# Patient Record
Sex: MALE | Race: WHITE | NOT HISPANIC OR LATINO | Employment: FULL TIME | RURAL
[De-identification: names, ages, dates, MRNs, and addresses within clinical notes are randomized per-mention and may not be internally consistent; named-entity substitution may affect disease eponyms.]

---

## 2023-10-10 ENCOUNTER — OFFICE VISIT (OUTPATIENT)
Dept: FAMILY MEDICINE CLINIC | Facility: CLINIC | Age: 58
End: 2023-10-10
Payer: COMMERCIAL

## 2023-10-10 VITALS
BODY MASS INDEX: 28.39 KG/M2 | OXYGEN SATURATION: 98 % | WEIGHT: 187.3 LBS | HEIGHT: 68 IN | DIASTOLIC BLOOD PRESSURE: 73 MMHG | HEART RATE: 78 BPM | SYSTOLIC BLOOD PRESSURE: 126 MMHG

## 2023-10-10 DIAGNOSIS — E78.00 PURE HYPERCHOLESTEROLEMIA: ICD-10-CM

## 2023-10-10 DIAGNOSIS — D69.6 THROMBOCYTOPENIA: ICD-10-CM

## 2023-10-10 DIAGNOSIS — F51.01 PRIMARY INSOMNIA: ICD-10-CM

## 2023-10-10 DIAGNOSIS — R03.0 ELEVATED BLOOD PRESSURE READING WITHOUT DIAGNOSIS OF HYPERTENSION: ICD-10-CM

## 2023-10-10 DIAGNOSIS — Z11.59 NEED FOR HEPATITIS C SCREENING TEST: ICD-10-CM

## 2023-10-10 DIAGNOSIS — I51.89 DIASTOLIC DYSFUNCTION: ICD-10-CM

## 2023-10-10 DIAGNOSIS — Z85.828 HISTORY OF NONMELANOMA SKIN CANCER: ICD-10-CM

## 2023-10-10 DIAGNOSIS — Z00.01 ENCOUNTER FOR GENERAL ADULT MEDICAL EXAMINATION WITH ABNORMAL FINDINGS: Primary | ICD-10-CM

## 2023-10-10 DIAGNOSIS — Z13.1 SCREENING FOR DIABETES MELLITUS: ICD-10-CM

## 2023-10-10 DIAGNOSIS — Z87.442 HISTORY OF KIDNEY STONES: ICD-10-CM

## 2023-10-10 DIAGNOSIS — N40.1 BENIGN PROSTATIC HYPERPLASIA WITH NOCTURIA: ICD-10-CM

## 2023-10-10 DIAGNOSIS — R35.1 BENIGN PROSTATIC HYPERPLASIA WITH NOCTURIA: ICD-10-CM

## 2023-10-10 DIAGNOSIS — Z13.220 SCREENING FOR HYPERLIPIDEMIA: ICD-10-CM

## 2023-10-11 ENCOUNTER — TELEPHONE (OUTPATIENT)
Dept: FAMILY MEDICINE CLINIC | Facility: CLINIC | Age: 58
End: 2023-10-11
Payer: COMMERCIAL

## 2023-10-11 DIAGNOSIS — D69.6 THROMBOCYTOPENIA: Primary | ICD-10-CM

## 2023-10-11 PROBLEM — E78.00 PURE HYPERCHOLESTEROLEMIA: Status: ACTIVE | Noted: 2023-10-11

## 2023-10-11 PROBLEM — I51.89 DIASTOLIC DYSFUNCTION: Status: ACTIVE | Noted: 2023-10-11

## 2023-10-11 PROBLEM — Z85.828 HISTORY OF NONMELANOMA SKIN CANCER: Status: ACTIVE | Noted: 2023-10-11

## 2023-10-11 LAB
ALBUMIN SERPL-MCNC: 4.9 G/DL (ref 3.8–4.9)
ALBUMIN/GLOB SERPL: 2.6 {RATIO} (ref 1.2–2.2)
ALP SERPL-CCNC: 84 IU/L (ref 44–121)
ALT SERPL-CCNC: 24 IU/L (ref 0–44)
AST SERPL-CCNC: 24 IU/L (ref 0–40)
BASOPHILS # BLD AUTO: 0 X10E3/UL (ref 0–0.2)
BASOPHILS NFR BLD AUTO: 1 %
BILIRUB SERPL-MCNC: 1.1 MG/DL (ref 0–1.2)
BUN SERPL-MCNC: 14 MG/DL (ref 8–27)
BUN/CREAT SERPL: 15 (ref 10–24)
CALCIUM SERPL-MCNC: 9.1 MG/DL (ref 8.6–10.2)
CHLORIDE SERPL-SCNC: 100 MMOL/L (ref 96–106)
CHOLEST SERPL-MCNC: 225 MG/DL (ref 100–199)
CO2 SERPL-SCNC: 26 MMOL/L (ref 20–29)
CREAT SERPL-MCNC: 0.96 MG/DL (ref 0.76–1.27)
EGFRCR SERPLBLD CKD-EPI 2021: 90 ML/MIN/1.73
EOSINOPHIL # BLD AUTO: 0 X10E3/UL (ref 0–0.4)
EOSINOPHIL NFR BLD AUTO: 1 %
ERYTHROCYTE [DISTWIDTH] IN BLOOD BY AUTOMATED COUNT: 12.3 % (ref 11.6–15.4)
GLOBULIN SER CALC-MCNC: 1.9 G/DL (ref 1.5–4.5)
GLUCOSE SERPL-MCNC: 85 MG/DL (ref 70–99)
HBA1C MFR BLD: 5.6 % (ref 4.8–5.6)
HCT VFR BLD AUTO: 45.7 % (ref 37.5–51)
HCV IGG SERPL QL IA: NON REACTIVE
HDLC SERPL-MCNC: 50 MG/DL
HGB BLD-MCNC: 15.7 G/DL (ref 13–17.7)
IMM GRANULOCYTES # BLD AUTO: 0 X10E3/UL (ref 0–0.1)
IMM GRANULOCYTES NFR BLD AUTO: 0 %
LDLC SERPL CALC-MCNC: 123 MG/DL (ref 0–99)
LYMPHOCYTES # BLD AUTO: 0.9 X10E3/UL (ref 0.7–3.1)
LYMPHOCYTES NFR BLD AUTO: 15 %
MCH RBC QN AUTO: 30 PG (ref 26.6–33)
MCHC RBC AUTO-ENTMCNC: 34.4 G/DL (ref 31.5–35.7)
MCV RBC AUTO: 87 FL (ref 79–97)
MONOCYTES # BLD AUTO: 0.4 X10E3/UL (ref 0.1–0.9)
MONOCYTES NFR BLD AUTO: 7 %
MORPHOLOGY BLD-IMP: ABNORMAL
NEUTROPHILS # BLD AUTO: 4.7 X10E3/UL (ref 1.4–7)
NEUTROPHILS NFR BLD AUTO: 76 %
PLATELET # BLD AUTO: 97 X10E3/UL (ref 150–450)
POTASSIUM SERPL-SCNC: 4 MMOL/L (ref 3.5–5.2)
PROT SERPL-MCNC: 6.8 G/DL (ref 6–8.5)
RBC # BLD AUTO: 5.24 X10E6/UL (ref 4.14–5.8)
SODIUM SERPL-SCNC: 140 MMOL/L (ref 134–144)
TRIGL SERPL-MCNC: 299 MG/DL (ref 0–149)
VLDLC SERPL CALC-MCNC: 52 MG/DL (ref 5–40)
WBC # BLD AUTO: 6.1 X10E3/UL (ref 3.4–10.8)

## 2023-10-11 NOTE — PROGRESS NOTES
"Patient is here for annual physical exam.  He was seen by another physician at this practice about 2 years ago, but is not new to me.  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  He says he has a tendency to have whitecoat syndrome hypertension, but his blood pressure is typically normal outside the office, he does monitor this--usually 120s/70s-low 80s.  He eats a healthy diet and runs about 3 miles daily.  He did bring some records from [place], and while I do not speak [place]ian, some of the lab work was interpretable.  His past history was updated.  He states he did have some type of cyst removed from his neck in the past, and was told that it was there since birth, but the details are unclear, and I asked if \"branchial cleft cyst\" rings a bell and he said no, but he is not certain.  Nonetheless, this has never caused any additional problems.  Patient says he did have an abnormal stress test in [place] couple of years ago, because he runs in a yearly mini marathon there, and they require cardiac clearance before participating.  However, the cardiologist sent him for further testing, and he was assured that there was no evidence of any significant coronary atherosclerosis (although he did not have a heart catheterization, so the details are unclear, he thinks it may have been a CTA).  He did have an echocardiogram last year that showed grade 1 diastolic dysfunction but a normal ejection fraction, and trivial MR and TR.  The patient has no symptoms of sleep apnea, other than mild snoring, and some nighttime awakening.  He denies feeling fatigued, excessively sleepy in the daytime, or getting drowsy while driving or riding in the car.  He does nap briefly in the afternoon sometimes but has done that all of his adult life.  Denies sleep hunger.  He has no heart failure symptoms and no leg " "swelling.  He has not had any difficulty with chest discomfort or shortness of breath while running.    His wife was recently diagnosed with sleep apnea, so he wondered if he needed to be tested.  However, he says as far as he knows he just doors a little bit at night, but does not wake himself up snoring, and as detailed above, denies any other sleep apnea symptoms.  Therefore, I recommended that he simply be aware of signs and symptoms of sleep apnea and if things change, he can let me know so that we can order a sleep study.  He says that he often wakes up at night, and when he wakes up he will go to the bathroom to urinate, so the urologist whom he has been seeing for years (Dr Cheikh Hassan) for kidney stones recommended that he take medication for BPH.  He took Avodart for a while, and he said his symptoms did improve (went from nocturia x 2 to nocturia x 1), and he decided to go off of the medication about a month ago.  The urologist has been checking his PSA, and the level before he started the Avodart was about 0.3, although it did come down to about 0.1 after taking the Avodart.  We did discuss the fact that his BPH symptoms may worsen again over time since he stopped taking the Avodart, but he says that they're Chief Complaint  Annual Exam    Subjective      Aries Forde presents to [facility]  History of Present Illness  See details above, this was in advertently dictated in the wrong location on the note, but the chief complaint and HPI are as above  Objective   Vital Signs:   Vitals:    10/10/23 1504 10/10/23 1628   BP: 142/80 126/73   BP Location: Left arm Right arm   Patient Position: Sitting Sitting   Cuff Size: Adult Adult   Pulse: 78    SpO2: 98%    Weight: 85 kg (187 lb 4.8 oz)    Height: 172.7 cm (68\")       /73 (BP Location: Right arm, Patient Position: Sitting, Cuff Size: Adult)   Pulse 78   Ht 172.7 cm (68\")   Wt 85 kg (187 lb 4.8 oz)   SpO2 98%   BMI " 28.48 kg/mý     Body mass index is 28.48 kg/mý.    Review of Systems   Constitutional:  Negative for activity change, appetite change, chills, fever, unexpected weight gain and unexpected weight loss.   HENT:  Negative for congestion, ear pain, facial swelling, hearing loss, nosebleeds, rhinorrhea, sinus pressure, sore throat, swollen glands and trouble swallowing.    Eyes:  Negative for blurred vision, double vision and visual disturbance.   Respiratory:  Negative for cough, choking, chest tightness, shortness of breath and wheezing.    Cardiovascular:  Negative for chest pain, palpitations and leg swelling.   Gastrointestinal:  Positive for GERD (Has this only occasionally, and not regularly). Negative for abdominal pain, blood in stool, constipation, diarrhea, nausea and vomiting.   Endocrine: Negative for polydipsia, polyphagia and polyuria.   Genitourinary:  Positive for nocturia. Negative for decreased urine volume, difficulty urinating, dysuria, flank pain, frequency, hematuria, urgency and urinary incontinence.   Musculoskeletal:  Negative for arthralgias, back pain, gait problem, joint swelling, myalgias and neck pain.   Skin:  Positive for skin lesions (He has been to dermatology recently and has some skin lesions removed). Negative for rash.   Allergic/Immunologic: Negative for food allergies and immunocompromised state.   Neurological:  Negative for dizziness, tremors, seizures, syncope, speech difficulty, weakness, light-headedness, numbness and headache.   Hematological:  Negative for adenopathy. Does not bruise/bleed easily.   Psychiatric/Behavioral:  Positive for sleep disturbance. Negative for suicidal ideas and depressed mood. The patient is not nervous/anxious.        Past History:  Medical History: has a past medical history of Abnormal cardiovascular stress test (2021), Anal fistula, BPH (benign prostatic hyperplasia), Cyst in neck at birth, Diastolic dysfunction (2022), Kidney stone  (12/1/1983), Nonmelanoma skin cancer, Perirectal abscess, and Thrombocytopenia.   Surgical History: has a past surgical history that includes Hernia repair (2/28/2016); Colonoscopy (06/01/2023); Anal Fistula Repair; Extracorporeal shock wave lithotripsy; and Kidney stone surgery.   Family History: family history includes Breast cancer in his sister; Cancer (age of onset: 68) in his mother; Diabetes type II in his father and mother; Drug abuse in his brother; Mental illness in his brother; Pulmonary embolism (age of onset: 80) in his father.   Social History: reports that he has never smoked. He has never used smokeless tobacco. He reports that he does not drink alcohol and does not use drugs.    No current outpatient medications on file.    Allergies: Patient has no known allergies.    Physical Exam  Constitutional:       General: He is not in acute distress.     Appearance: Normal appearance. He is not toxic-appearing.   HENT:      Head: Normocephalic and atraumatic.      Right Ear: Tympanic membrane, ear canal and external ear normal.      Left Ear: Tympanic membrane, ear canal and external ear normal.      Nose: Nose normal.      Mouth/Throat:      Mouth: Mucous membranes are moist.      Pharynx: Oropharynx is clear.   Eyes:      General: No scleral icterus.     Extraocular Movements: Extraocular movements intact.      Conjunctiva/sclera: Conjunctivae normal.      Pupils: Pupils are equal, round, and reactive to light.   Neck:      Vascular: No carotid bruit.   Cardiovascular:      Rate and Rhythm: Normal rate and regular rhythm.      Pulses: Normal pulses.      Heart sounds: Normal heart sounds. No murmur heard.     No gallop.   Pulmonary:      Effort: Pulmonary effort is normal.      Breath sounds: Normal breath sounds. No wheezing, rhonchi or rales.   Chest:      Chest wall: No tenderness.   Abdominal:      General: Bowel sounds are normal. There is no distension.      Palpations: Abdomen is soft. There is no  mass.      Tenderness: There is no abdominal tenderness. There is no guarding or rebound.   Musculoskeletal:         General: No swelling, tenderness or deformity. Normal range of motion.      Cervical back: Normal range of motion. No rigidity.      Right lower leg: No edema.      Left lower leg: No edema.   Lymphadenopathy:      Cervical: No cervical adenopathy.   Skin:     General: Skin is warm and dry.      Capillary Refill: Capillary refill takes less than 2 seconds.      Coloration: Skin is not jaundiced or pale.      Findings: Lesion (Eschar on scalp from recent dermatology procedure, healing well) present. No bruising, erythema or rash.   Neurological:      General: No focal deficit present.      Mental Status: He is alert and oriented to person, place, and time.      Cranial Nerves: No cranial nerve deficit.      Sensory: No sensory deficit.      Motor: No weakness.      Coordination: Coordination normal.      Gait: Gait normal.      Deep Tendon Reflexes: Reflexes normal.   Psychiatric:         Mood and Affect: Mood normal.         Behavior: Behavior normal.         Thought Content: Thought content normal.         Judgment: Judgment normal.                   Assessment and Plan   Diagnoses and all orders for this visit:    1. Encounter for general adult medical examination with abnormal findings (Primary)  -     CBC & Differential  -     Comprehensive Metabolic Panel  Healthy lifestyle measures including healthy diet regular exercise and weight control were discussed.  Preventive healthcare measures were also discussed.  Patient did have a PSA at his urologist office a month ago and it was normal.  He had a colonoscopy in June of this year and is not due again for 10 years, since he has no personal history of polyps and no family history of colon cancer or colon polyps.  We will check some routine labs today.  2. Screening for hyperlipidemia  -     Lipid Panel    3. Screening for diabetes mellitus  -      Hemoglobin A1c    4. Need for hepatitis C screening test  -     Hepatitis C Antibody    5. Pure hypercholesterolemia  This has been mild enough that medical treatment has not been recommended, and he does work on eating a healthy diet and exercising regularly and was praised for this  6. Benign prostatic hyperplasia with nocturia  Symptoms are currently mild enough that he does not desire treatment  7. History of kidney stones  Recent checkup with the urologist was fine with no kidney stones being present  8. Elevated blood pressure reading without diagnosis of hypertension  Repeat blood pressure was 126/73, appropriately normal.  Therefore he will continue to monitor this over time.  I did discuss the fact that there is diastolic dysfunction on echocardiogram, but that he has no physical signs or symptoms related to this, so it is probably benign at this time, but if he does see his blood pressure staying over 130/80, he should consider treatment (even though the  Cardiology Association typically does not recommend treatment until the blood pressure is 140/90, like the AAFP, though the ACC recommends treatment at 130/80).  9. History of nonmelanoma skin cancer  He will continue with skin protection, sun avoidance, and routine follow-up with dermatology  10. Diastolic dysfunction  As above, patient has no symptoms or signs on exam.  If he does develop any chest discomfort or shortness of breath with exertion, or any other symptoms that could be considered angina equivalent, or any symptoms of heart failure such as PND, orthopnea, or leg swelling, and he will return or see his physician in [place]  11. Primary insomnia  Patient does not think this problem is significant enough to warrant treatment or work-up, but he will let me or his physician in [place] know if that changes.    ADDENDUM--12.Thrombocytopenia: Pt's labs revealed platelet count of 97, and when contacted about this, he said he forgot to mention  that this is a chronic issue, and platelets usually run in 70s-90s. Thus, felt it was important to document this, and advised him to have it monitored regularly--details about any previous workup were not provided.       Follow Up   No follow-ups on file.  Patient was given instructions and counseling regarding his condition or for health maintenance advice. Please see specific information pulled into the AVS if appropriate.     Perez Amin, MDreally not that bothersome now and he does not wish to start back on medication at this time.    We did discuss differential diagnosis of nocturnal awakenings, and he says that sometimes he can fall right back asleep and other times he has some difficulty and it takes an hour.  However, this is not a persistent problem, and he does not think it is significant enough to require work-up or medical treatment at this time.  He denies any depression symptoms.  He denies any restless leg syndrome symptoms.

## 2023-10-11 NOTE — TELEPHONE ENCOUNTER
Please update Care Gaps, pt had colonoscopy on 6/1/2023 and will not be due again until 6/1/2033. thanks

## 2023-10-12 PROBLEM — D69.6 THROMBOCYTOPENIA: Status: ACTIVE | Noted: 2023-10-12

## 2023-12-28 ENCOUNTER — OFFICE VISIT (OUTPATIENT)
Dept: FAMILY MEDICINE CLINIC | Facility: CLINIC | Age: 58
End: 2023-12-28
Payer: COMMERCIAL

## 2023-12-28 VITALS
HEIGHT: 68 IN | WEIGHT: 187 LBS | OXYGEN SATURATION: 95 % | BODY MASS INDEX: 28.34 KG/M2 | HEART RATE: 73 BPM | DIASTOLIC BLOOD PRESSURE: 80 MMHG | SYSTOLIC BLOOD PRESSURE: 120 MMHG

## 2023-12-28 DIAGNOSIS — R42 DIZZINESS: Primary | ICD-10-CM

## 2023-12-28 NOTE — PROGRESS NOTES
"Chief Complaint  Dizziness (Pt states having dizziness going on for a week )    Subjective          Aries Forde presents to [facility]  History of Present Illness  Patient in today for evaluation on intermittent dizzy symptoms that he feels more related to head movt. States had vertigo in past that lasted a few weeks after suspected virus and then went away on it's own. He states last week started with similar symptoms, if moved quickly or got up fast- room was spinning. That has since improved and today only minimal symptoms if moves head quickly . He denies any fever.  Denies nasal congestion or cough . Denies chest pain , palpitations or shortness of breath. Denies headache or visual changes. States lives in [place] and getting ready to travel back.   Dizziness  This is a new problem. The current episode started in the past 7 days. Associated symptoms include vertigo. Pertinent negatives include no abdominal pain, chest pain, congestion, coughing, fatigue, fever, headaches, nausea, sore throat or vomiting.       Objective   Vital Signs:   /80   Pulse 73   Ht 172.7 cm (68\")   Wt 84.8 kg (187 lb)   SpO2 95%   BMI 28.43 kg/m²     Body mass index is 28.43 kg/m².    Review of Systems   Constitutional:  Negative for fatigue and fever.   HENT:  Negative for congestion and sore throat.    Respiratory:  Negative for cough and shortness of breath.    Cardiovascular:  Negative for chest pain, palpitations and leg swelling.   Gastrointestinal:  Negative for abdominal pain, diarrhea, nausea and vomiting.   Genitourinary:  Negative for dysuria, frequency and hematuria.   Neurological:  Positive for dizziness and vertigo. Negative for syncope, light-headedness and headache.       Past History:  Medical History: has a past medical history of Abnormal cardiovascular stress test (2021), Anal fistula, BPH (benign prostatic hyperplasia), Cyst in neck at birth, Diastolic dysfunction (2022), " Kidney stone (12/1/1983), Nonmelanoma skin cancer, Perirectal abscess, and Thrombocytopenia.   Surgical History: has a past surgical history that includes Hernia repair (2/28/2016); Colonoscopy (06/01/2023); Anal Fistula Repair; Extracorporeal shock wave lithotripsy; and Kidney stone surgery.   Family History: family history includes Breast cancer in his sister; Cancer (age of onset: 68) in his mother; Diabetes type II in his father and mother; Drug abuse in his brother; Mental illness in his brother; Pulmonary embolism (age of onset: 80) in his father.   Social History: reports that he has never smoked. He has never used smokeless tobacco. He reports that he does not drink alcohol and does not use drugs.    No current outpatient medications on file.  Allergies: Patient has no known allergies.    Physical Exam  Constitutional:       Appearance: Normal appearance.   HENT:      Right Ear: Tympanic membrane normal.      Left Ear: Tympanic membrane normal.      Mouth/Throat:      Pharynx: Oropharynx is clear.   Eyes:      Conjunctiva/sclera: Conjunctivae normal.      Pupils: Pupils are equal, round, and reactive to light.   Cardiovascular:      Rate and Rhythm: Normal rate and regular rhythm.      Heart sounds: Normal heart sounds.   Pulmonary:      Effort: Pulmonary effort is normal.      Breath sounds: Normal breath sounds.   Abdominal:      Palpations: Abdomen is soft.      Tenderness: There is no abdominal tenderness.   Neurological:      Mental Status: He is alert and oriented to person, place, and time.      Cranial Nerves: Cranial nerves 2-12 are intact.      Coordination: Coordination is intact.      Gait: Gait is intact.      Comments: Walks with nml gait. Good upper and lower strength.    Psychiatric:         Mood and Affect: Mood normal.         Behavior: Behavior normal.             Assessment and Plan   Diagnoses and all orders for this visit:    1. Dizziness (Primary)  Discussed with patient could be inner  ear vs vertigo type symptoms , however dizziness is a symptom and could be from other sources. Offered to check labs and EKG today- patient declines and states symptoms have shown improvement over past week  so prefers to hold off on additional testing at this time. Encouraged to stay well hydrated- which he states does drink water daily. He has flonase at home and may try this daily and/or meclizine if needed. If symptoms not improving, recommend further evaluation. For any worsening/progressing symptoms recommend ER evaluation.  He states has ENT back home in [place] and will f/up if symptoms persist.           Follow Up   No follow-ups on file.  Patient was given instructions and counseling regarding his condition or for health maintenance advice. Please see specific information pulled into the AVS if appropriate.     Brittany Crawford PA-C